# Patient Record
Sex: MALE | Race: WHITE
[De-identification: names, ages, dates, MRNs, and addresses within clinical notes are randomized per-mention and may not be internally consistent; named-entity substitution may affect disease eponyms.]

---

## 2018-01-21 ENCOUNTER — HOSPITAL ENCOUNTER (EMERGENCY)
Dept: HOSPITAL 88 - ER | Age: 83
Discharge: ANOTHER HOSPITAL | End: 2018-01-21
Payer: COMMERCIAL

## 2018-01-21 VITALS — BODY MASS INDEX: 23.7 KG/M2 | HEIGHT: 72 IN | WEIGHT: 175 LBS
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
## 2018-01-21 DIAGNOSIS — Z91.81: ICD-10-CM

## 2018-01-21 DIAGNOSIS — I48.91: ICD-10-CM
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
## 2018-01-21 DIAGNOSIS — I21.3: ICD-10-CM

## 2018-01-21 DIAGNOSIS — S00.01XA: ICD-10-CM

## 2018-01-21 DIAGNOSIS — Z23: ICD-10-CM

## 2018-01-21 DIAGNOSIS — S06.370A: Primary | ICD-10-CM

## 2018-01-21 LAB
ALBUMIN SERPL-MCNC: 4.4 G/DL (ref 3.5–5)
ALBUMIN/GLOB SERPL: 1.3 {RATIO} (ref 0.8–2)
ALP SERPL-CCNC: 78 IU/L (ref 40–150)
ALT SERPL-CCNC: 25 IU/L (ref 0–55)
ANION GAP SERPL CALC-SCNC: 16.5 MMOL/L (ref 8–16)
BASOPHILS # BLD AUTO: 0 10*3/UL (ref 0–0.1)
BASOPHILS NFR BLD AUTO: 0.2 % (ref 0–1)
BNP BLD-MCNC: 656 PG/ML (ref 0–100)
BUN SERPL-MCNC: 32 MG/DL (ref 7–26)
BUN/CREAT SERPL: 15 (ref 6–25)
CALCIUM SERPL-MCNC: 9.5 MG/DL (ref 8.4–10.2)
CHLORIDE SERPL-SCNC: 109 MMOL/L (ref 98–107)
CK MB SERPL-MCNC: 35.4 NG/ML (ref 0–5)
CO2 SERPL-SCNC: 22 MMOL/L (ref 22–29)
DEPRECATED APTT PLAS QN: 26.4 SECONDS (ref 23.8–35.5)
DEPRECATED INR PLAS: 1.06
DEPRECATED NEUTROPHILS # BLD AUTO: 9.7 10*3/UL (ref 2.1–6.9)
EGFRCR SERPLBLD CKD-EPI 2021: 30 ML/MIN (ref 60–?)
EOSINOPHIL # BLD AUTO: 0 10*3/UL (ref 0–0.4)
EOSINOPHIL NFR BLD AUTO: 0.2 % (ref 0–6)
ERYTHROCYTE [DISTWIDTH] IN CORD BLOOD: 13.4 % (ref 11.7–14.4)
GLOBULIN PLAS-MCNC: 3.4 G/DL (ref 2.3–3.5)
GLUCOSE SERPLBLD-MCNC: 123 MG/DL (ref 74–118)
HCT VFR BLD AUTO: 48.9 % (ref 38.2–49.6)
HGB BLD-MCNC: 16.5 G/DL (ref 14–18)
LYMPHOCYTES # BLD: 1.1 10*3/UL (ref 1–3.2)
LYMPHOCYTES NFR BLD AUTO: 9.1 % (ref 18–39.1)
MCH RBC QN AUTO: 32.5 PG (ref 28–32)
MCHC RBC AUTO-ENTMCNC: 33.7 G/DL (ref 31–35)
MCV RBC AUTO: 96.4 FL (ref 81–99)
MONOCYTES # BLD AUTO: 1.4 10*3/UL (ref 0.2–0.8)
MONOCYTES NFR BLD AUTO: 11.6 % (ref 4.4–11.3)
NEUTS SEG NFR BLD AUTO: 78.5 % (ref 38.7–80)
PLATELET # BLD AUTO: 239 X10E3/UL (ref 140–360)
POTASSIUM SERPL-SCNC: 4.5 MMOL/L (ref 3.5–5.1)
PROTHROMBIN TIME: 14.3 SECONDS (ref 11.9–14.5)
RBC # BLD AUTO: 5.07 X10E6/UL (ref 4.3–5.7)
SODIUM SERPL-SCNC: 143 MMOL/L (ref 136–145)
TROPONIN I SERPL DL<=0.01 NG/ML-MCNC: 4.49 NG/ML (ref 0–0.3)

## 2018-01-21 PROCEDURE — 87400 INFLUENZA A/B EACH AG IA: CPT

## 2018-01-21 PROCEDURE — 83605 ASSAY OF LACTIC ACID: CPT

## 2018-01-21 PROCEDURE — 84484 ASSAY OF TROPONIN QUANT: CPT

## 2018-01-21 PROCEDURE — 80053 COMPREHEN METABOLIC PANEL: CPT

## 2018-01-21 PROCEDURE — 85730 THROMBOPLASTIN TIME PARTIAL: CPT

## 2018-01-21 PROCEDURE — 99284 EMERGENCY DEPT VISIT MOD MDM: CPT

## 2018-01-21 PROCEDURE — 82550 ASSAY OF CK (CPK): CPT

## 2018-01-21 PROCEDURE — 85610 PROTHROMBIN TIME: CPT

## 2018-01-21 PROCEDURE — 90714 TD VACC NO PRESV 7 YRS+ IM: CPT

## 2018-01-21 PROCEDURE — 93005 ELECTROCARDIOGRAM TRACING: CPT

## 2018-01-21 PROCEDURE — 83880 ASSAY OF NATRIURETIC PEPTIDE: CPT

## 2018-01-21 PROCEDURE — 85025 COMPLETE CBC W/AUTO DIFF WBC: CPT

## 2018-01-21 PROCEDURE — 71010: CPT

## 2018-01-21 PROCEDURE — 70450 CT HEAD/BRAIN W/O DYE: CPT

## 2018-01-21 PROCEDURE — 36415 COLL VENOUS BLD VENIPUNCTURE: CPT

## 2018-01-21 PROCEDURE — 82553 CREATINE MB FRACTION: CPT

## 2018-01-21 PROCEDURE — 90471 IMMUNIZATION ADMIN: CPT

## 2018-01-21 RX ADMIN — SODIUM CHLORIDE STA MLS/HR: 9 INJECTION, SOLUTION INTRAVENOUS at 14:26

## 2018-01-21 RX ADMIN — CLOSTRIDIUM TETANI TOXOID ANTIGEN (FORMALDEHYDE INACTIVATED) AND CORYNEBACTERIUM DIPHTHERIAE TOXOID ANTIGEN (FORMALDEHYDE INACTIVATED) ONE ML: 5; 2 SUSPENSION INTRAMUSCULAR at 15:11

## 2018-01-21 NOTE — DIAGNOSTIC IMAGING REPORT
******** ADDENDUM #1 ********



Findings were discussed with Dr. Lawrence at 2:58 PM on 1/21/2018.



Signed by: Dr. Mitchell Masters M.D. on 1/21/2018 3:34 PM

******** ORIGINAL REPORT ********



Exam: Head CT without contrast

History:  Fall, altered mental status

Comparison studies:  None



Technique:

Axial images were obtained from the skull base to the vertex.

Coronal and sagittal images reconstructed from the axial data.

Intravenous contrast: None



Findings:



Scalp: No abnormalities.

Bones: No fractures, blastic or lytic lesions.



Brain volume: Moderate generalized volume loss with slightly greater

disproportionate volume loss along the anteromedial temporal lobes and

hippocampi.



Ventricles: Left lateral ventricle and third ventricle are partially effaced.

No acute hydrocephalus.



Extra-axial spaces: A large left hemispheric subacute subdural hematoma

measures up to 2.3 cm in max thickness. Regional mass effect results in 8mm

midline shift from left to right and mild subfalcine herniation. No uncal or

transtentorial herniation. No acute hemorrhage. 



Parenchyma: 

No mass, acute hemorrhage or acute cortical vascular insults. A few scattered

subtle hypodensities in the supratentorial white matter are nonspecific but

most compatible with chronic small vessel ischemic changes.



Sellar/suprasellar region: No abnormalities.

Craniocervical junction: Patent foramen magnum. No Chiari one malformation.



Incidental findings: 

Atherosclerotic calcifications in the carotid siphons and intradural vertebral

arteries. 



IMPRESSION:

 

1.  Large (2.3 cm thick) left hemispheric subacute subdural hematoma results in

0.8 cm midline shift from left to right and mild subfalcine herniation. No

transtentorial herniation. 

2.  No other acute abnormalities or other changes when compared to the previous

brain MRI of 2/11/2011 when allowing for differences in technique.

3.  Mild chronic microvascular ischemic changes.

4.  Generalized volume loss with slightly greater volume loss along the

anteromedial temporal lobes and hippocampi which can be seen with Alzheimer's

dementia in appropriate clinical setting.



Signed by: Dr. Mitchell Masters M.D. on 1/21/2018 2:59 PM

## 2018-01-21 NOTE — DIAGNOSTIC IMAGING REPORT
EXAMINATION:  CHEST SINGLE (PORTABLE)    



INDICATION: Fall  

\S\ERMD ORDER

\S\[id number]

\S\[id number]

\S\Y



COMPARISON:  None

     

FINDINGS:  AP view   



TUBES and LINES:  None.



LUNGS:  Lungs are well inflated.  Lungs are clear.   There is no evidence of

pneumonia or pulmonary edema.



PLEURA:  No pleural effusion or pneumothorax.



HEART AND MEDIASTINUM:  The cardiac silhouette is borderline in size.    



BONES AND SOFT TISSUES:  No acute osseous lesion.  Soft tissues are

unremarkable.



UPPER ABDOMEN: No free air under the diaphragm.    



IMPRESSION: 

No acute thoracic abnormality.





Signed by: Dr. Ty Mandel MD on 1/21/2018 3:48 PM